# Patient Record
Sex: MALE | Race: WHITE
[De-identification: names, ages, dates, MRNs, and addresses within clinical notes are randomized per-mention and may not be internally consistent; named-entity substitution may affect disease eponyms.]

---

## 2018-04-27 ENCOUNTER — HOSPITAL ENCOUNTER (OUTPATIENT)
Dept: HOSPITAL 17 - CPRE | Age: 63
End: 2018-04-27
Attending: ORTHOPAEDIC SURGERY
Payer: COMMERCIAL

## 2018-04-27 DIAGNOSIS — Z01.810: ICD-10-CM

## 2018-04-27 DIAGNOSIS — M17.12: ICD-10-CM

## 2018-04-27 DIAGNOSIS — Z01.812: Primary | ICD-10-CM

## 2018-04-27 DIAGNOSIS — R00.1: ICD-10-CM

## 2018-04-27 DIAGNOSIS — M79.609: ICD-10-CM

## 2018-04-27 LAB
BUN SERPL-MCNC: 19 MG/DL (ref 7–18)
CALCIUM SERPL-MCNC: 9.3 MG/DL (ref 8.5–10.1)
CHLORIDE SERPL-SCNC: 105 MEQ/L (ref 98–107)
COLOR UR: YELLOW
CREAT SERPL-MCNC: 0.98 MG/DL (ref 0.6–1.3)
ERYTHROCYTE [DISTWIDTH] IN BLOOD BY AUTOMATED COUNT: 13.1 % (ref 11.6–17.2)
GFR SERPLBLD BASED ON 1.73 SQ M-ARVRAT: 77 ML/MIN (ref 89–?)
GLUCOSE UR STRIP-MCNC: (no result) MG/DL
HCO3 BLD-SCNC: 28.7 MEQ/L (ref 21–32)
HCT VFR BLD CALC: 43.3 % (ref 39–51)
HGB BLD-MCNC: 14.7 GM/DL (ref 13–17)
HGB UR QL STRIP: (no result)
INR PPP: 1 RATIO
KETONES UR STRIP-MCNC: (no result) MG/DL
MCH RBC QN AUTO: 31 PG (ref 27–34)
MCHC RBC AUTO-ENTMCNC: 34 % (ref 32–36)
MCV RBC AUTO: 91.4 FL (ref 80–100)
MUCOUS THREADS #/AREA URNS LPF: (no result) /LPF
NITRITE UR QL STRIP: (no result)
PLATELET # BLD: 232 TH/MM3 (ref 150–450)
PMV BLD AUTO: 8.5 FL (ref 7–11)
PROTHROMBIN TIME: 10.4 SEC (ref 9.8–11.6)
RBC # BLD AUTO: 4.74 MIL/MM3 (ref 4.5–5.9)
SODIUM SERPL-SCNC: 141 MEQ/L (ref 136–145)
SP GR UR STRIP: 1.02 (ref 1–1.03)
URINE LEUKOCYTE ESTERASE: (no result)
WBC # BLD AUTO: 4.9 TH/MM3 (ref 4–11)

## 2018-04-27 PROCEDURE — 85027 COMPLETE CBC AUTOMATED: CPT

## 2018-04-27 PROCEDURE — 80048 BASIC METABOLIC PNL TOTAL CA: CPT

## 2018-04-27 PROCEDURE — 85730 THROMBOPLASTIN TIME PARTIAL: CPT

## 2018-04-27 PROCEDURE — 36415 COLL VENOUS BLD VENIPUNCTURE: CPT

## 2018-04-27 PROCEDURE — 81001 URINALYSIS AUTO W/SCOPE: CPT

## 2018-04-27 PROCEDURE — 93005 ELECTROCARDIOGRAM TRACING: CPT

## 2018-04-27 PROCEDURE — 85610 PROTHROMBIN TIME: CPT

## 2018-04-27 NOTE — EKG
Date Performed: 04/27/2018       Time Performed: 11:56:19

 

PTAGE:      62 years

 

EKG:      SINUS BRADYCARDIA MINIMAL ST DEPRESSION BORDERLINE ECG Compared to 

 

 PREVIOUS TRACING            ,  rate has decreased and non-specific ST changes

 

DOCTOR:   Ady Short  Interpretating Date/Time  04/27/2018 23:43:27

## 2018-05-08 ENCOUNTER — HOSPITAL ENCOUNTER (INPATIENT)
Dept: HOSPITAL 17 - HSDI | Age: 63
LOS: 1 days | Discharge: HOME HEALTH | DRG: 470 | End: 2018-05-09
Attending: ORTHOPAEDIC SURGERY | Admitting: ORTHOPAEDIC SURGERY
Payer: MEDICARE

## 2018-05-08 VITALS
RESPIRATION RATE: 18 BRPM | TEMPERATURE: 98.3 F | DIASTOLIC BLOOD PRESSURE: 75 MMHG | OXYGEN SATURATION: 96 % | SYSTOLIC BLOOD PRESSURE: 115 MMHG | HEART RATE: 51 BPM

## 2018-05-08 VITALS
HEART RATE: 59 BPM | DIASTOLIC BLOOD PRESSURE: 70 MMHG | OXYGEN SATURATION: 97 % | SYSTOLIC BLOOD PRESSURE: 124 MMHG | TEMPERATURE: 97.9 F | RESPIRATION RATE: 17 BRPM

## 2018-05-08 VITALS — BODY MASS INDEX: 27.87 KG/M2 | HEIGHT: 74 IN | WEIGHT: 217.16 LBS

## 2018-05-08 VITALS — HEART RATE: 65 BPM

## 2018-05-08 DIAGNOSIS — E78.5: ICD-10-CM

## 2018-05-08 DIAGNOSIS — R11.0: ICD-10-CM

## 2018-05-08 DIAGNOSIS — M17.12: Primary | ICD-10-CM

## 2018-05-08 PROCEDURE — C1776 JOINT DEVICE (IMPLANTABLE): HCPCS

## 2018-05-08 PROCEDURE — 85018 HEMOGLOBIN: CPT

## 2018-05-08 PROCEDURE — 86900 BLOOD TYPING SEROLOGIC ABO: CPT

## 2018-05-08 PROCEDURE — 88305 TISSUE EXAM BY PATHOLOGIST: CPT

## 2018-05-08 PROCEDURE — 94150 VITAL CAPACITY TEST: CPT

## 2018-05-08 PROCEDURE — 86850 RBC ANTIBODY SCREEN: CPT

## 2018-05-08 PROCEDURE — 86901 BLOOD TYPING SEROLOGIC RH(D): CPT

## 2018-05-08 PROCEDURE — 0SRD0JA REPLACEMENT OF LEFT KNEE JOINT WITH SYNTHETIC SUBSTITUTE, UNCEMENTED, OPEN APPROACH: ICD-10-PCS | Performed by: ORTHOPAEDIC SURGERY

## 2018-05-08 PROCEDURE — C9290 INJ, BUPIVACAINE LIPOSOME: HCPCS

## 2018-05-08 PROCEDURE — 73560 X-RAY EXAM OF KNEE 1 OR 2: CPT

## 2018-05-08 PROCEDURE — 88307 TISSUE EXAM BY PATHOLOGIST: CPT

## 2018-05-08 PROCEDURE — 3E0T3BZ INTRODUCTION OF ANESTHETIC AGENT INTO PERIPHERAL NERVES AND PLEXI, PERCUTANEOUS APPROACH: ICD-10-PCS

## 2018-05-08 PROCEDURE — 85014 HEMATOCRIT: CPT

## 2018-05-08 RX ADMIN — OXYTOCIN SCH MLS/HR: 10 INJECTION, SOLUTION INTRAMUSCULAR; INTRAVENOUS at 10:05

## 2018-05-08 RX ADMIN — OXYTOCIN SCH MLS/HR: 10 INJECTION, SOLUTION INTRAMUSCULAR; INTRAVENOUS at 16:12

## 2018-05-08 RX ADMIN — MULTIPLE VITAMINS W/ MINERALS TAB SCH TAB: TAB at 09:00

## 2018-05-08 RX ADMIN — CLONIDINE HYDROCHLORIDE SCH MG: 0.1 INJECTION, SOLUTION EPIDURAL at 21:27

## 2018-05-08 RX ADMIN — CLONIDINE HYDROCHLORIDE SCH MG: 0.1 INJECTION, SOLUTION EPIDURAL at 10:30

## 2018-05-08 RX ADMIN — CLONIDINE HYDROCHLORIDE SCH MG: 0.1 INJECTION, SOLUTION EPIDURAL at 16:12

## 2018-05-08 RX ADMIN — ASPIRIN SCH MG: 81 TABLET ORAL at 09:00

## 2018-05-08 RX ADMIN — ASPIRIN SCH MG: 81 TABLET ORAL at 19:00

## 2018-05-08 NOTE — PD.CONS
HPI


Service


[facility] Hospitalists


Consult Requested By


 


Dr. Treadwell.


Reason for Consult


Medical management.


Primary Care Physician


Demetra Lizarraga MD


Diagnoses:  


History of Present Illness


This is a 63-year-old male past medical history of hyperlipidemia and 

osteoarthritis who presents to [facility] for an elective right 

total knee arthroplasty.  Dr. Treadwell performed surgical procedure today.  

The patient otherwise denies any chest pain, shortness of breath, fevers, chills

, nausea, vomiting or abdominal pain.





Review of Systems


As per HPI, other systems reviewed by me and negative





Past Family Social History


Allergies:  


Coded Allergies:  


     No Known Allergies (Verified  Allergy, Mild, 11/12/03)


Past Medical History


Hyperlipidemia


Past Surgical History


1.  Left knee arthroscopy 


2.  Adenoidectomy.


Reported Medications





Reported Meds & Active Scripts


Active


Hydrocodone-Acetamin 7.5-325 (Hydrocodone/Acetaminophen) 7.5 Mg-325 Mg Tablet 1 

Tab PO Q4H PRN


Reported


Osteo Bi-Flex One A Day (Boswellia-Glucosamine-Vitamin) 1 Tab 1 Tab PO DAILY


Centrum (Multiple Vitamins W/ Minerals) 1 Chew 1 Tab CHEW DAILY


Simvastatin 20 Mg Tab 20 Mg PO HS


Pepcid (Famotidine) 20 Mg Tab 10 Mg PO DAILY PRN


Active Ordered Medications





Current Medications








 Medications


  (Trade)  Dose


 Ordered  Sig/Mati


 Route  Start Time


 Stop Time Status Last Admin


 


  (Hibiclens 4%


 Top Soln)  1 applic  ONCE


 TOPICAL  5/8/18 05:45


 5/11/18 05:44  5/8/18 06:00


 


 


 Cefazolin Sodium/


 Dextrose  50 ml @ 


 100 mls/hr  ON  CALL


 IV  5/8/18 05:45


 5/11/18 05:44  5/8/18 07:00


 


 


 Lactated Ringer's  1,000 ml @ 


 30 mls/hr  Q24H PRN


 IV  5/8/18 05:45


 5/11/18 05:44  5/8/18 06:00


 


 


 Sodium Chloride  500 ml @ 


 30 mls/hr  [id number] PRN


 IV  5/8/18 05:45


 5/11/18 05:44   


 


 


  (Lopressor)  25 mg  ON CALL  PRN


 PO  5/8/18 05:45


 5/11/18 05:44   


 


 


  (Betadine 5%


 Antisepsis Kit)  1 applic  ON CALL  PRN


 EACH NARE  5/8/18 05:45


 5/11/18 05:44  5/8/18 06:21


 


 


  (Chlorhexidine


 2% Cloth)  3 pack  ON CALL  PRN


 TOPICAL  5/8/18 05:45


 5/11/18 05:44  5/8/18 05:30


 


 


  (NovoLIN R INJ)  See


 Protocol


 Table ...  ON CALL  PRN


 SQ  5/8/18 05:45


 5/11/18 05:44   


 


 


 Lactated Ringer's  1,000 ml @ 


 80 mls/hr  [id number]


 IV  5/8/18 07:00


    5/8/18 16:12


 


 


  (Morphine Inj)  4 mg  Q3H  PRN


 IV PUSH  5/8/18 07:00


     


 


 


  (Norco  7.5-325


 Mg)  1 tab  Q4H  PRN


 PO  5/8/18 07:00


     


 


 


  (Norco  7.5-325


 Mg)  2 tab  Q4H  PRN


 PO  5/8/18 07:00


     


 


 


  (Toradol Inj)  15 mg  Q6H


 IVP  5/8/18 10:00


 5/10/18 04:01  5/8/18 16:12


 


 


  (Zofran Inj)  4 mg  Q6H  PRN


 IVP  5/8/18 07:00


    5/8/18 16:11


 


 


  (Colace)  100 mg  BID


 PO  5/9/18 21:00


     


 


 


  (Ambien)  5 mg  HS  PRN


 PO  5/8/18 07:00


     


 


 


  (Milk Of


 Magnesia Liq)  30 ml  DAILY  PRN


 PO  5/8/18 07:00


     


 


 


  (Ecotrin Ec)  81 mg  BID


 PO  5/8/18 09:00


     


 


 


  (Pepcid)  10 mg  DAILY  PRN


 PO  5/8/18 07:00


     


 


 


  (Theragran M Tab)  1 tab  DAILY


 PO  5/8/18 09:00


     


 


 


  (Pravachol)  40 mg  HS


 PO  5/8/18 21:00


     


 


 


  (Misc Nursing


 Information)  ALL


 NURSING


 DEPARTME...  [facility]  PRN


 .XX  5/8/18 09:52


 5/9/18 09:51   


 


 


 Cefazolin Sodium


 1000 mg/Sodium


 Chloride  100 ml @ 


 200 mls/hr  Q6H


 IV  5/8/18 16:00


 5/9/18 04:29  5/8/18 16:12


 








Family History


Review with the patient and noncontributory to the current presentation.


Social History


Denies tobacco.


Drinks alcohol moderately.





Physical Exam


Vital Signs





Vital Signs








  Date Time  Temp Pulse Resp B/P (MAP) Pulse Ox O2 Delivery O2 Flow Rate FiO2


 


5/8/18 15:43 98.3 51 18 115/75 (88) 96   


 


5/8/18 14:30      Nasal Cannula 2.00 


 


5/8/18 14:15  54 14 108/74 (85) 97 Room Air  


 


5/8/18 13:00  49 14 110/74 (86) 97 Room Air  


 


5/8/18 12:00  50 14 117/70 (86) 98 Room Air  


 


5/8/18 11:00  51 14 125/80 (95) 99 Room Air  


 


5/8/18 10:45  54 14 118/77 (91) 97 Room Air  


 


5/8/18 10:30  52 14 119/84 (96) 98 Room Air  


 


5/8/18 10:15  56 14 113/76 (88) 96 Room Air  


 


5/8/18 09:58 97.9 62 14 104/62 (76) 98 Room Air  


 


5/8/18 06:00  65      


 


5/8/18 06:00     98 Nasal Cannula 2 


 


5/8/18 05:59 98.3 65 18 133/81 (98) 98   








Physical Exam


GENERAL: This is a well-nourished, well-developed patient, in no apparent 

distress.


SKIN: No rashes, ecchymoses or lesions. Cool and dry.


HEAD: Atraumatic. Normocephalic. No temporal or scalp tenderness.


EYES: Pupils equal round and reactive. Extraocular motions intact. No scleral 

icterus. No injection or drainage. 


ENT: Nose without bleeding, purulent drainage or septal hematoma. Throat 

without erythema, tonsillar hypertrophy or exudate. Uvula midline. Airway 

patent.


NECK: Trachea midline. No JVD or lymphadenopathy. Supple, nontender, no 

meningeal signs.


CARDIOVASCULAR: Regular rate and rhythm without murmurs, gallops, or rubs. 


RESPIRATORY: Clear to auscultation. Breath sounds equal bilaterally. No wheezes

, rales, or rhonchi.  


GASTROINTESTINAL: Abdomen soft, non-tender, nondistended. No hepato-splenomegaly

, or palpable masses. No guarding.


MUSCULOSKELETAL: Extremities without clubbing, cyanosis, or edema. No joint 

tenderness, effusion, or edema noted. No calf tenderness. Negative Homans sign 

bilaterally.


NEUROLOGICAL: Awake and alert. Cranial nerves II through XII intact.  Motor and 

sensory grossly within normal limits. Five out of 5 muscle strength in all 

muscle groups.  Normal speech.


Imaging





Current Medications








 Medications


  (Trade)  Dose


 Ordered  Sig/Mati


 Route  Start Time


 Stop Time Status Last Admin


 


  (Hibiclens 4%


 Top Soln)  1 applic  ONCE


 TOPICAL  5/8/18 05:45


 5/11/18 05:44  5/8/18 06:00


 


 


 Cefazolin Sodium/


 Dextrose  50 ml @ 


 100 mls/hr  ON  CALL


 IV  5/8/18 05:45


 5/11/18 05:44  5/8/18 07:00


 


 


 Lactated Ringer's  1,000 ml @ 


 30 mls/hr  Q24H PRN


 IV  5/8/18 05:45


 5/11/18 05:44  5/8/18 06:00


 


 


 Sodium Chloride  500 ml @ 


 30 mls/hr  [id number] PRN


 IV  5/8/18 05:45


 5/11/18 05:44   


 


 


  (Lopressor)  25 mg  ON CALL  PRN


 PO  5/8/18 05:45


 5/11/18 05:44   


 


 


  (Betadine 5%


 Antisepsis Kit)  1 applic  ON CALL  PRN


 EACH NARE  5/8/18 05:45


 5/11/18 05:44  5/8/18 06:21


 


 


  (Chlorhexidine


 2% Cloth)  3 pack  ON CALL  PRN


 TOPICAL  5/8/18 05:45


 5/11/18 05:44  5/8/18 05:30


 


 


  (NovoLIN R INJ)  See


 Protocol


 Table ...  ON CALL  PRN


 SQ  5/8/18 05:45


 5/11/18 05:44   


 


 


 Lactated Ringer's  1,000 ml @ 


 80 mls/hr  [id number]


 IV  5/8/18 07:00


    5/8/18 16:12


 


 


  (Morphine Inj)  4 mg  Q3H  PRN


 IV PUSH  5/8/18 07:00


     


 


 


  (Norco  7.5-325


 Mg)  1 tab  Q4H  PRN


 PO  5/8/18 07:00


     


 


 


  (Norco  7.5-325


 Mg)  2 tab  Q4H  PRN


 PO  5/8/18 07:00


     


 


 


  (Toradol Inj)  15 mg  Q6H


 IVP  5/8/18 10:00


 5/10/18 04:01  5/8/18 16:12


 


 


  (Zofran Inj)  4 mg  Q6H  PRN


 IVP  5/8/18 07:00


    5/8/18 16:11


 


 


  (Colace)  100 mg  BID


 PO  5/9/18 21:00


     


 


 


  (Ambien)  5 mg  HS  PRN


 PO  5/8/18 07:00


     


 


 


  (Milk Of


 Magnesia Liq)  30 ml  DAILY  PRN


 PO  5/8/18 07:00


     


 


 


  (Ecotrin Ec)  81 mg  BID


 PO  5/8/18 09:00


     


 


 


  (Pepcid)  10 mg  DAILY  PRN


 PO  5/8/18 07:00


     


 


 


  (Theragran M Tab)  1 tab  DAILY


 PO  5/8/18 09:00


     


 


 


  (Pravachol)  40 mg  HS


 PO  5/8/18 21:00


     


 


 


  (Misc Nursing


 Information)  ALL


 NURSING


 DEPARTME...  [facility]  PRN


 .XX  5/8/18 09:52


 5/9/18 09:51   


 


 


 Cefazolin Sodium


 1000 mg/Sodium


 Chloride  100 ml @ 


 200 mls/hr  Q6H


 IV  5/8/18 16:00


 5/9/18 04:29  5/8/18 16:12


 











Assessment and Plan


Problem List:  


(1) Primary osteoarthritis of left knee


ICD Code:  M17.12 - Unilateral primary osteoarthritis, left knee


Status:  Resolved


(2) Status post total left knee replacement


ICD Code:  Z96.652 - Presence of left artificial knee joint


(3) HLD (hyperlipidemia)


ICD Code:  E78.5 - Hyperlipidemia, unspecified


Assessment and Plan


The patient status post left total knee arthroplasty.


Admit to the medical/surgical floor


Monitor vital signs


Pain control as per orthopedic surgery -patient currently on Norco and morphine 

sulfate as needed for breakthrough pain.


Zofran as needed for nausea.


Bowel regimen to prevent constipation given the use of opiates.


Continue statin for hyperlipidemia


DVT prophylaxis: As per orthopedic surgery discretion.  Chemoprophylaxis should 

be started as soon as the result bleeding decreases.


Code Status


Full code


Discussed Condition With


Patient











Cm Hensley MD May 8, 2018 19:21

## 2018-05-08 NOTE — HHI.FF
Face to Face Verification


Diagnosis:  


(1) Status post total left knee replacement


Physical Therapy


Gait training


Knee:  Total knee, Protocol: Left, Gait training, Full weight bearing


Left LE Weight Bearing:  WB as tolerated


Left LE Range of Motion:  Active ROM (Active, active assisted, passive range of 

motion.  Range of motion goal is 0 extension to 135 of flexion.  Range of 

motion achieved in the operating room was 0 extension to 145 of flexion.)





Nursing


Nursing:  Dressing changes


Dressing Changes:  Daily dressing change, Coverderm/Primapore


Additional Instructions


Do not remove Dermabond Prineo.











I have seen patient Viraj Nair on 5/8/18. My clinical findings 

support the need for the requested home health care services because:








 Ltd mobility - disease progression





 Limited ability to care for self





 High risk of falls














I certify that my clinical findings support that this patient is homebound 

because:








 Post-op weakness





 Unsteady gait/balance





 Unsafe to leave home unassisted

















JUSTINE Treadwell MD (Charles) May 8, 2018 09:32

## 2018-05-08 NOTE — RADRPT
EXAM DATE/TIME:  05/08/2018 11:07 

 

[facility] COMPARISON:     

No previous studies available for comparison.

 

                     

INDICATIONS :     

Post op left knee surgery.

                     

 

MEDICAL HISTORY :     

None.          

 

SURGICAL HISTORY :     

None.   

 

ENCOUNTER:     

Initial                                        

 

ACUITY:     

1 day      

 

PAIN SCORE:     

7/10

 

LOCATION:     

Left  Knee.

 

FINDINGS:     

Two view examination of the left knee demonstrates total arthroplasty. All 3 components are appropria
tely positioned. No fracture. Suprapatellar KIT type drain is in place.

 

CONCLUSION:     

Appropriate postoperative appearance of the left knee status post total arthroplasty.

 

 

 

 Brian Cadena MD on May 08, 2018 at 10:39           

Board Certified Radiologist.

 This report was verified electronically.

## 2018-05-08 NOTE — PD.OP
__________________________________________________





Operative Report


Date of Surgery:  May 8, 2018


Preoperative Diagnosis:  


(1) Primary osteoarthritis of left knee


Postoperative Diagnosis:  


(1) Primary osteoarthritis of left knee


Procedure:


Left total knee arthroplasty using [employer] Triathlon prosthesis (uncemented).


Anesthesia:


Spinal with supplemental adductor canal block and local with Exparel


Surgeon:


Jayro Treadwell MD


Assistant(s):


SARAH Emery


Operation and Findings:


Indications and Findings: This 63-year-old man has had long-standing arthritis 

in his left knee which has been nonresponsive to conservative measures as 

detailed in the history and physical examination.  He has been treated with 

nonsteroidal anti-inflammatory agents, bracing, ambulatory aids, exercises and 

analgesics.  He has continued to have difficulty walking with limited 

weightbearing due to pain.  He has pain with stairs and difficulty with 

walking.  Physical findings showed degenerative varum with medial laxity, 

crepitation on motion and palpable osteophytes.  X-rays show loss of articular 

cartilage bone-on-bone in the medial compartment with tricompartmental 

osteophytes and medial eburnation.


Operative findings: There is significant osteoarthritis particularly in the 

medial compartment with loss of articular cartilage to bone on bone.  There are 

changes in the patellofemoral and lateral compartments as well.  There were 

tricompartmental osteophytes.





The prosthesis used was a [employer] Triathlon prosthesis.


The femur was a size 6, uncemented, cruciate retaining. The tibial baseplate 

was a size 7 Tritanium is with a 9 mm, cruciate retaining, X3 polyethylene 

spacer. The patella was a size 40 mm asymmetric Tritanium backed. 





The patient was brought to the clean-air operating suite.  A spinal anesthetic 

was administered as well as a regional anesthetic by adductor canal block.  The 

position was supine with a small bolster under the hip on the operative side.  

A pneumatic tourniquet was applied to the upper thigh.  The lower extremity was 

then prepped with alcohol, Hibiclens and ChloraPrep and draped in the usual 

manner with the knee draped free.  An appropriate timeout procedure was carried 

out.





An incision was made from about 3 fingerbreadths above the superior medial pole 

of patella down the tibial tubercle on the medial side.  The incision was 

deepened through the subcutaneous tissue to the retinacular structures which 

were exposed medially and laterally.  A medial retinacular incision was then 

made from the superior middle pole of patella down the tibial tubercle and up 

into the quadriceps tendon splitting it longitudinally and the medial one 

third.  The patella was reflected.  The infrapatellar fat pad was debulked.  A 

soft tissue mass measuring approximately 1 cm in diameter by 2 cm in length 

appearing to be possibly some hemorrhagic synovium but relatively unusual 

appearance was excised from the area of the anterior intercondylar notch.  The 

anterior cruciate ligament was excised.  Medial and lateral meniscectomies were 

initiated.  Fenestrations were made in the distal femur and proximal tibia for 

intramedullary referencing guides.





The distal femoral cutting guide and jig were then assembled for a 5, 8 mm 

cut.  When this was fit position and placed cutting block was stabilized with 

pins.  The jig was removed.  The distal femoral cut was then completed with the 

oscillating saw.  The sizing guide was then positioned in place along 

Whitesides line and the epicondylar axis and stabilized with pins.  The femoral 

size was then determined as noted above.  The 4-in-1 cutting block was then 

positioned in place.  Anterior and posterior cuts were made followed by 

posterior and anterior chamfer cuts taking care to prevent injury to 

ligamentous structures.  Osteophytes were then trimmed from the distal femur.  

A bone plug was then placed into the fenestration of the distal femur.  The 

proximal tibia was then exposed.  The medial and lateral meniscectomies were 

completed.  The proximal tibial cutting guide was then positioned in place and 

stabilized with a pin for rotation.  The depth of cut was then verified with a 

stylus off the lateral side.  The cutting block was stabilized with pins.  The 

jig was removed.  The depth of cut was then verified and adjusted appropriately 

with the use of the spacer block.  The proximal tibial cut was then made with 

the oscillating saw taking care to prevent injury to neurovascular and 

ligamentous structures.  Proximal tibial bone was removed.  Local anesthetic 

was administered with Exparel in the posterior capsule.  The tibial baseplate 

trial was then positioned in place.  After verifying the appropriate size, the 

base plate trial was positioned in place along with its spacer.  The femoral 

component was then impacted into place.  The alignment was checked.  The tibial 

baseplate was then pinned in place on the tibia.  Attention was directed to the 

patella.  The patella drill guide was positioned in place for the appropriate 

sized patella.  Patellar drilling was then carried out.  The trial patella was 

positioned in place.  The exposed lateral facet of patella was taken down.  The 

knee was taken through a range of motion which was easily 0 extension to 145 

of flexion.





The patella trial was removed.  The femoral drill holes were made.  The femoral 

trials were removed.  The tibial spacer was removed.  A bone plug was placed 

into the proximal tibia.  The tibial punch was impacted through the proximal 

tibial punch guide.  This was all removed followed by placement of the tibial 

drill guide.  The tibial drill holes were then made.  The guide was removed.  

The cut ends of bone were then cleaned with pulse lavage.  The tibial baseplate 

was then impacted into place and seated appropriately.  The spacer was 

inserted.  The the femoral component was then impacted into place and seated 

appropriately.  The patella component was then seated with the patellar vice 

and tightened appropriately.  The knee was taken through a range of motion 

which was comparable to the previous range of motion with excellent stability 

in flexion and extension and appropriate patellofemoral tracking.  The 

remainder of the Exparel was then injected throughout the knee as a local 

anesthetic.  Drains were brought out the superior lateral aspect of the 

suprapatellar pouch.  Wound closure then commenced using 0 Vicryl interrupted 

figure-of-eight sutures for the capsular and fascial structures, 2-0 Vicryl 

interrupted simple sutures with buried knots for the subcutaneous tissues and 4-

0 Monocryl, continuous subcuticular closure for the skin.  The wound was then 

dressed with Dermabond Prineo followed by Optifoam silver impregnated dressing.

  Sterile soft roll with a cooling pad and Ace bandage from the base of the 

toes to mid thigh were then applied.  Patient was then transferred from the 

operating room to the recovery room in satisfactory condition having tolerated 

procedure well.





Counts are correct.





Specimens: Synovial irregularity, intercondylar notch.





Estimated blood loss: 250 mL











JUSTINE Treadwell MD (Charles) May 8, 2018 09:30

## 2018-05-09 VITALS
DIASTOLIC BLOOD PRESSURE: 65 MMHG | TEMPERATURE: 98.2 F | SYSTOLIC BLOOD PRESSURE: 123 MMHG | HEART RATE: 71 BPM | RESPIRATION RATE: 18 BRPM | OXYGEN SATURATION: 96 %

## 2018-05-09 VITALS
HEART RATE: 78 BPM | SYSTOLIC BLOOD PRESSURE: 121 MMHG | DIASTOLIC BLOOD PRESSURE: 61 MMHG | OXYGEN SATURATION: 97 % | RESPIRATION RATE: 16 BRPM | TEMPERATURE: 97.8 F

## 2018-05-09 VITALS
OXYGEN SATURATION: 96 % | HEART RATE: 82 BPM | TEMPERATURE: 98.7 F | SYSTOLIC BLOOD PRESSURE: 124 MMHG | DIASTOLIC BLOOD PRESSURE: 66 MMHG | RESPIRATION RATE: 18 BRPM

## 2018-05-09 VITALS
OXYGEN SATURATION: 98 % | SYSTOLIC BLOOD PRESSURE: 110 MMHG | TEMPERATURE: 99 F | RESPIRATION RATE: 18 BRPM | DIASTOLIC BLOOD PRESSURE: 66 MMHG | HEART RATE: 71 BPM

## 2018-05-09 LAB
HCT VFR BLD CALC: 33.4 % (ref 39–51)
HGB BLD-MCNC: 11.5 GM/DL (ref 13–17)

## 2018-05-09 RX ADMIN — CLONIDINE HYDROCHLORIDE SCH MG: 0.1 INJECTION, SOLUTION EPIDURAL at 08:11

## 2018-05-09 RX ADMIN — OXYTOCIN SCH MLS/HR: 10 INJECTION, SOLUTION INTRAMUSCULAR; INTRAVENOUS at 08:00

## 2018-05-09 RX ADMIN — MULTIPLE VITAMINS W/ MINERALS TAB SCH TAB: TAB at 08:10

## 2018-05-09 RX ADMIN — CLONIDINE HYDROCHLORIDE SCH MG: 0.1 INJECTION, SOLUTION EPIDURAL at 04:31

## 2018-05-09 RX ADMIN — ASPIRIN SCH MG: 81 TABLET ORAL at 08:10

## 2018-05-09 NOTE — PD.ORT.PN
Subjective


Post Op Day #:  1


Subjective Remarks


He is doing well.  He has minimal complaints related to the knee at this time.  

His ambulation was limited by nausea postoperatively.  He no longer has some 

nausea.  He is anxious to get started on therapy and get home.


Range of Motion


-8 of extension to 103 of flexion.


Distance Walked


3 feet from bed to chair with PT.





Objective


Vitals





Vital Signs








  Date Time  Temp Pulse Resp B/P (MAP) Pulse Ox O2 Delivery O2 Flow Rate FiO2


 


5/9/18 04:00 99.0 71 18 110/66 (81) 98   


 


5/9/18 00:01 98.7 82 18 124/66 (85) 96   


 


5/8/18 21:30      Room Air  


 


5/8/18 20:00 97.9 59 17 124/70 (88) 97   


 


5/8/18 15:43 98.3 51 18 115/75 (88) 96   


 


5/8/18 14:30      Nasal Cannula 2.00 


 


5/8/18 14:15  54 14 108/74 (85) 97 Room Air  


 


5/8/18 13:00  49 14 110/74 (86) 97 Room Air  


 


5/8/18 12:00  50 14 117/70 (86) 98 Room Air  


 


5/8/18 11:00  51 14 125/80 (95) 99 Room Air  


 


5/8/18 10:45  54 14 118/77 (91) 97 Room Air  


 


5/8/18 10:30  52 14 119/84 (96) 98 Room Air  


 


5/8/18 10:15  56 14 113/76 (88) 96 Room Air  


 


5/8/18 09:58 97.9 62 14 104/62 (76) 98 Room Air  














I/O      


 


 5/8/18 5/8/18 5/8/18 5/9/18 5/9/18 5/9/18





 07:00 15:00 23:00 07:00 15:00 23:00


 


Intake Total  1529.85 ml  420 ml  


 


Output Total  550 ml 295 ml 1025 ml  


 


Balance  979.85 ml -295 ml -605 ml  


 


      


 


Intake Oral  120 ml  420 ml  


 


IV Total  1409.85 ml    


 


Output Urine Total  300 ml  725 ml  


 


Drainage Total   295 ml 300 ml  


 


Estimated Blood Loss  250 ml    


 


# Bowel Movements    0  








Imaging





Last 72 hours Impressions








Knee X-Ray 5/8/18 0654 Signed





Impressions: 





 Service Date/Time:  Tuesday, May 8, 2018 11:07 - CONCLUSION:  Appropriate 





 postoperative appearance of the left knee status post total arthroplasty.     





 Brian Cadena MD 








Objective Remarks


He is resting comfortably, supine in bed, in the CPM.


The neurovascular status is intact.


His dressing is dry and intact.





Assessment & Plan


Ortho Post Op Day #:  1


Problem List:  


(1) Primary osteoarthritis of left knee


ICD Codes:  M17.12 - Unilateral primary osteoarthritis, left knee


Status:  Resolved


(2) Status post total left knee replacement


ICD Codes:  Z96.652 - Presence of left artificial knee joint


Plan:  Continue postop care and PT.





Assessment and Plan


Condition: Good.


Orthopedically stable.


DVT prophylaxis:  TEDs, aspirin, sequentials.


Discharge plans: Home with home health care.


An appointment was scheduled through the office.


Prescriptions: Norco 7.5/325











JUSTINE Treadwell MD (Charles) May 9, 2018 06:59

## 2018-05-09 NOTE — [facility].DS
Discharge Summary


Admission Date


May 8, 2018 at 05:19


Discharge Date:  May 9, 2018


Admitting Diagnosis


Primary osteoarthritis, left knee


Diagnosis:  


(1) Primary osteoarthritis of left knee


Diagnosis:  Principal


ICD Codes:  M17.12 - Unilateral primary osteoarthritis, left knee


Status:  Resolved


(2) Status post total left knee replacement


Diagnosis:  Principal


ICD Codes:  Z96.652 - Presence of left artificial knee joint


Procedures


Left total knee arthroplasty using [employer] Triathlon prosthesis (uncemented) on 

5/8/2018


Brief History


This is a 63 year old male patient has had progressively worsening left knee 

pain secondary to osteoarthritis.  When he is active, he has significant pain.  

He has less pain if he does not do anything.  He has not responded to 

conservative measures including nonsteroidal anti-inflammatory agents, 

analgesics, exercise and the like.  Physical findings showed laxity in the 

medial compartment with tenderness and palpable osteophytes.  X-ray showed loss 

of articular cartilage to bone on bone in the medial compartment with 

osteophytes and eburnation.


Imaging





Last 72 hours Impressions








Knee X-Ray 5/8/18 0654 Signed





Impressions: 





 Service Date/Time:  Tuesday, May 8, 2018 11:07 - CONCLUSION:  Appropriate 





 postoperative appearance of the left knee status post total arthroplasty.     





 Brian Cadena MD 








PE at Discharge


He is resting comfortably, supine in bed, in the CPM.


The neurovascular status is intact.


His dressing is dry and intact.


Hospital Course


The patient was admitted as noted above.  The above noted operative procedure 

was carried out that day.  Preoperatively prophylactic antibiotics were 

administered Ancef according to protocol.  These were continued 

postoperatively.  The patient also received tranexamic acid to help with 

hemostasis according to protocol.  In the postanesthesia care unit a continuous 

passive motion device was initiated.  Also initiated were mechanical methods of 

DVT prophylaxis in the form of JEFFREY stockings and sequentials.  Physical therapy 

was initiated on the day of surgery.


On postoperative day #1 physical therapy continued.  The use of the continuous 

passive motion device continued.  DVT prophylaxis with aspirin 81 mg twice 

daily was initiated at this time.  The patient continued physical therapy 

throughout the hospitalization.  The distance walked and range of motion 

improved throughout the hospitalization.


The patient was discharged on postoperative day 1 with the disposition being to 

home with home health care.  An appointment for follow-up was made prior to 

admission.


Pt Condition on Discharge:  Good


Discharge Disposition:  Disch w/ Home Health Serv


Discharge Instructions


Diet Instructions:  As Tolerated, No Restrictions


Activities You Can Perform:  Full Weight Bearing, Shower Only-No Bath


Activities to Avoid:  Lifting/Bending, Strenuous Activity, Bathing, Driving


Follow up Referrals:  


Orthopedics with JUSTINE Treadwell MD ([facility])





New Medications:  


Aspirin DR (Aspirin DR) 81 Mg Tabdr


81 MG PO BID for Prevent Blood Clot for 30 Days, #60 TAB





Hydrocodone/Acetaminophen (Hydrocodone-Acetamin 7.5-325) 7.5 Mg-325 Mg Tablet


1 TAB PO Q4H PRN for PAIN SCALE 1 TO 10, #30 TAB





 


Continued Medications:  


Boswellia-Glucosamine-Vitamin (Osteo Bi-Flex One A Day) 1 Tab


1 TAB PO DAILY, TAB





Famotidine (Pepcid) 20 Mg Tab


10 MG PO DAILY PRN for gerd, #60 TAB 0 Refills





Multiple Vitamins W/ Minerals (Centrum) 1 Chew


1 TAB CHEW DAILY for Nutritional Supplement, TAB 0 Refills





Simvastatin (Simvastatin) 20 Mg Tab


20 MG PO HS for Cholesterol Management, #30 TAB 0 Refills

















JUSTINE Treadwell MD ([facility]) May 9, 2018 07:32